# Patient Record
Sex: MALE | Race: WHITE | NOT HISPANIC OR LATINO | Employment: FULL TIME | URBAN - METROPOLITAN AREA
[De-identification: names, ages, dates, MRNs, and addresses within clinical notes are randomized per-mention and may not be internally consistent; named-entity substitution may affect disease eponyms.]

---

## 2023-10-11 ENCOUNTER — APPOINTMENT (OUTPATIENT)
Dept: PRIMARY CARE | Facility: CLINIC | Age: 68
End: 2023-10-11
Payer: COMMERCIAL

## 2023-12-05 ENCOUNTER — APPOINTMENT (OUTPATIENT)
Dept: PRIMARY CARE | Facility: CLINIC | Age: 68
End: 2023-12-05
Payer: COMMERCIAL

## 2024-01-11 ENCOUNTER — OFFICE VISIT (OUTPATIENT)
Dept: PRIMARY CARE | Facility: CLINIC | Age: 69
End: 2024-01-11
Payer: MEDICARE

## 2024-01-11 ENCOUNTER — TELEPHONE (OUTPATIENT)
Dept: PRIMARY CARE | Facility: CLINIC | Age: 69
End: 2024-01-11

## 2024-01-11 VITALS
SYSTOLIC BLOOD PRESSURE: 130 MMHG | OXYGEN SATURATION: 99 % | WEIGHT: 162 LBS | HEART RATE: 63 BPM | HEIGHT: 68 IN | RESPIRATION RATE: 14 BRPM | DIASTOLIC BLOOD PRESSURE: 70 MMHG | BODY MASS INDEX: 24.55 KG/M2

## 2024-01-11 DIAGNOSIS — E78.5 HYPERLIPIDEMIA, UNSPECIFIED HYPERLIPIDEMIA TYPE: ICD-10-CM

## 2024-01-11 DIAGNOSIS — R35.1 NOCTURIA: ICD-10-CM

## 2024-01-11 DIAGNOSIS — Z00.00 PREVENTATIVE HEALTH CARE: ICD-10-CM

## 2024-01-11 DIAGNOSIS — Z00.00 MEDICARE ANNUAL WELLNESS VISIT, INITIAL: Primary | ICD-10-CM

## 2024-01-11 DIAGNOSIS — H91.93 BILATERAL HEARING LOSS, UNSPECIFIED HEARING LOSS TYPE: ICD-10-CM

## 2024-01-11 DIAGNOSIS — K50.919 CROHN'S DISEASE WITH COMPLICATION, UNSPECIFIED GASTROINTESTINAL TRACT LOCATION (MULTI): ICD-10-CM

## 2024-01-11 PROCEDURE — 1170F FXNL STATUS ASSESSED: CPT | Performed by: INTERNAL MEDICINE

## 2024-01-11 PROCEDURE — 1036F TOBACCO NON-USER: CPT | Performed by: INTERNAL MEDICINE

## 2024-01-11 PROCEDURE — G0402 INITIAL PREVENTIVE EXAM: HCPCS | Performed by: INTERNAL MEDICINE

## 2024-01-11 RX ORDER — BISMUTH SUBSALICYLATE 262 MG
1 TABLET,CHEWABLE ORAL DAILY
COMMUNITY
End: 2024-03-13 | Stop reason: ALTCHOICE

## 2024-01-11 ASSESSMENT — PATIENT HEALTH QUESTIONNAIRE - PHQ9
1. LITTLE INTEREST OR PLEASURE IN DOING THINGS: NOT AT ALL
2. FEELING DOWN, DEPRESSED OR HOPELESS: NOT AT ALL
SUM OF ALL RESPONSES TO PHQ9 QUESTIONS 1 AND 2: 0

## 2024-01-11 ASSESSMENT — ACTIVITIES OF DAILY LIVING (ADL)
DOING_HOUSEWORK: INDEPENDENT
GROCERY_SHOPPING: INDEPENDENT
MANAGING_FINANCES: INDEPENDENT
TAKING_MEDICATION: INDEPENDENT
DRESSING: INDEPENDENT
BATHING: INDEPENDENT

## 2024-01-11 ASSESSMENT — ENCOUNTER SYMPTOMS
NAUSEA: 0
JOINT SWELLING: 0
DIARRHEA: 0
COUGH: 0
ROS GI COMMENTS: PER HPI
FEVER: 0
VOMITING: 0
CHILLS: 0
SHORTNESS OF BREATH: 0
CONSTIPATION: 0
DIAPHORESIS: 0
MYALGIAS: 0

## 2024-01-11 NOTE — TELEPHONE ENCOUNTER
Pt left before getting the pneumonia shot.  I dont beleve you put the order in, but if you can addend the patient summary saying he received the vaccine today and take that out?    Patient went to pharmacy to get the vaccine.  Thank you.

## 2024-01-11 NOTE — PROGRESS NOTES
"Subjective   Reason for Visit: Jarod Covarrubias is an 68 y.o. male here for a  INITIAL /WELCOME  Medicare Wellness visit.    HAD FLU SHOT    SEES EYE  YEARLY       Past Medical, Surgical, and Family History reviewed and updated in chart.         HPI  MEDICARE ANNUAL CHECKUP    DOING WELL OTHER THAN MOST OF OCTOBER WAS IN [place], MAY HAVE EATEN SOME FOOD THAT GAVE HIM DIARRHEA FOR A FEW WEEKS, ATE RICE AND TOOK IMMODIUM AND NOW BETTER    SCHEDULED FOR COLONOSCOPY WITH DR. 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  HAD NOISE EXPOSURE IN LIFE    Patient Care Team:  Jon Francois MD as PCP - General (Internal Medicine)     Review of Systems   Constitutional:  Negative for chills, diaphoresis and fever.   Respiratory:  Negative for cough and shortness of breath.    Cardiovascular:  Negative for chest pain and leg swelling.   Gastrointestinal:  Negative for constipation, diarrhea, nausea and vomiting.        PER HPI   Musculoskeletal:  Negative for joint swelling and myalgias.       Objective   Vitals:  /70   Pulse 63   Resp 14   Ht 1.727 m (5' 8\")   Wt 73.5 kg (162 lb)   SpO2 99%   BMI 24.63 kg/m²       Physical Exam  Vitals reviewed.   Constitutional:       General: He is not in acute distress.     Appearance: He is not ill-appearing.   Cardiovascular:      Rate and Rhythm: Normal rate and regular rhythm.      Pulses: Normal pulses.      Heart sounds:      No gallop.   Pulmonary:      Breath sounds: Normal breath sounds. No wheezing, rhonchi or rales.   Abdominal:      General: Abdomen is flat. Bowel sounds are normal.      Palpations: Abdomen is soft.      Tenderness: There is no guarding or rebound.   Musculoskeletal:      Right lower leg: No edema.      Left lower leg: No edema.         Assessment/Plan "   Problem List Items Addressed This Visit       Crohn's disease with complication, unspecified gastrointestinal tract location (CMS/[facility])    Current Assessment & Plan     Monitored BY DR. VILLALOBOS, GI         Medicare annual wellness visit, initial - Primary     Other Visit Diagnoses       Hyperlipidemia, unspecified hyperlipidemia type        Relevant Orders    Lipid Panel    Abdominal aorta anuerysm (AAA) screening    Nocturia        Relevant Orders    Prostate Specific Antigen, Screen    Preventative health care        Relevant Orders    Hepatitis C Antibody    Bilateral hearing loss, unspecified hearing loss type        Relevant Orders    Referral to Audiology          Patient Instructions    FASTING LABS ARE ORDERED FOR TODAY TO CATCH UP ON SCREENING LABS    3.  LABS FROM LAST WEEK ARE REVIEWED AND DO NOT SHOW ANY SUSPICION FOR CHRON'S ACTIVITY    4.  ONGOING DIET/EXERCISE/WEIGHT MANAGEMENT    5.  FOLLOW UP DR. VILLALOBOS FOR COLONOSCOPY AS PLANNED    6.  CONTINUE REGULAR EYE EXAMS AS YOU ARE DOING    8.  YEARLY FOLLOW UP OR AS NEEDED HERE.    9.  REFER [facility] FOR HEARING CHECK

## 2024-01-11 NOTE — PATIENT INSTRUCTIONS
FASTING LABS ARE ORDERED FOR TODAY TO CATCH UP ON SCREENING LABS    3.  LABS FROM LAST WEEK ARE REVIEWED AND DO NOT SHOW ANY SUSPICION FOR CHRON'S ACTIVITY    4.  ONGOING DIET/EXERCISE/WEIGHT MANAGEMENT    5.  FOLLOW UP DR. VILLALOBOS FOR COLONOSCOPY AS PLANNED    6.  CONTINUE REGULAR EYE EXAMS AS YOU ARE DOING    8.  YEARLY FOLLOW UP OR AS NEEDED HERE.    9.  REFER [facility] FOR HEARING CHECK

## 2024-01-22 ENCOUNTER — LAB (OUTPATIENT)
Dept: LAB | Facility: LAB | Age: 69
End: 2024-01-22
Payer: MEDICARE

## 2024-01-22 ENCOUNTER — HOSPITAL ENCOUNTER (OUTPATIENT)
Dept: CARDIOLOGY | Facility: CLINIC | Age: 69
Discharge: HOME | End: 2024-01-22
Payer: MEDICARE

## 2024-01-22 DIAGNOSIS — E78.5 HYPERLIPIDEMIA, UNSPECIFIED HYPERLIPIDEMIA TYPE: ICD-10-CM

## 2024-01-22 DIAGNOSIS — Z00.00 PREVENTATIVE HEALTH CARE: ICD-10-CM

## 2024-01-22 DIAGNOSIS — Z13.6 ENCOUNTER FOR SCREENING FOR CARDIOVASCULAR DISORDERS: ICD-10-CM

## 2024-01-22 DIAGNOSIS — R35.1 NOCTURIA: ICD-10-CM

## 2024-01-22 LAB
CHOLEST SERPL-MCNC: 209 MG/DL (ref 0–199)
CHOLESTEROL/HDL RATIO: 4
HCV AB SER QL: NONREACTIVE
HDLC SERPL-MCNC: 52.3 MG/DL
LDLC SERPL CALC-MCNC: 139 MG/DL
NON HDL CHOLESTEROL: 157 MG/DL (ref 0–149)
PSA SERPL-MCNC: 1.89 NG/ML
TRIGL SERPL-MCNC: 90 MG/DL (ref 0–149)
VLDL: 18 MG/DL (ref 0–40)

## 2024-01-22 PROCEDURE — 36415 COLL VENOUS BLD VENIPUNCTURE: CPT

## 2024-01-22 PROCEDURE — 86803 HEPATITIS C AB TEST: CPT

## 2024-01-22 PROCEDURE — 76706 US ABDL AORTA SCREEN AAA: CPT

## 2024-01-22 PROCEDURE — 84153 ASSAY OF PSA TOTAL: CPT

## 2024-01-22 PROCEDURE — 80061 LIPID PANEL: CPT

## 2024-01-22 PROCEDURE — 76706 US ABDL AORTA SCREEN AAA: CPT | Performed by: INTERNAL MEDICINE

## 2024-01-23 ENCOUNTER — CLINICAL SUPPORT (OUTPATIENT)
Dept: AUDIOLOGY | Facility: CLINIC | Age: 69
End: 2024-01-23
Payer: MEDICARE

## 2024-01-23 DIAGNOSIS — H91.93 BILATERAL HEARING LOSS, UNSPECIFIED HEARING LOSS TYPE: ICD-10-CM

## 2024-01-23 DIAGNOSIS — H90.3 ASYMMETRIC SNHL (SENSORINEURAL HEARING LOSS): Primary | ICD-10-CM

## 2024-01-23 DIAGNOSIS — H93.11 TINNITUS OF RIGHT EAR: ICD-10-CM

## 2024-01-23 PROCEDURE — 92557 COMPREHENSIVE HEARING TEST: CPT | Performed by: AUDIOLOGIST

## 2024-01-23 PROCEDURE — 92567 TYMPANOMETRY: CPT | Performed by: AUDIOLOGIST

## 2024-01-23 NOTE — PROGRESS NOTES
Mr. Covarrubias age 68, was seen today for a hearing evaluation at the referral of his PCP, Dr. Francois.  He reported concern for difficulty understanding conversation in restaurants and other noisy environments.  He also reported tinnitus in his right ear only.  Ear pressure, pain, history of occupational noise exposure was denied but he did state [place].    Results:  Otoscopy revealed clear ear canals and tympanic membranes were visualized bilaterally.  Tympanometry revealed normal, Type A tympanograms, indicating normal ear canal volume, peak pressure and compliance bilaterally.  Audiometric thresholds revealed a slight sloping to moderate sensorineural hearing loss in his left ear and a slight sloping to profound sensorineural hearing loss in his right ear.  Word recognition scores were excellent bilaterally.    Recommendations:  Follow-up with PCP, Dr. Francois, as medically directed.  Consider possible binaural amplification.  Retest hearing annually to monitor.

## 2024-02-15 PROBLEM — I44.7 LBBB (LEFT BUNDLE BRANCH BLOCK): Status: ACTIVE | Noted: 2024-02-15

## 2024-02-15 PROBLEM — H91.93 BILATERAL HEARING LOSS: Status: ACTIVE | Noted: 2024-02-15

## 2024-02-15 PROBLEM — R35.1 NOCTURIA: Status: ACTIVE | Noted: 2024-02-15

## 2024-02-15 PROBLEM — E78.5 HYPERLIPIDEMIA: Status: ACTIVE | Noted: 2024-02-15

## 2024-02-15 RX ORDER — MULTIVITAMIN
TABLET ORAL
COMMUNITY
Start: 2021-09-14

## 2024-03-13 ENCOUNTER — OFFICE VISIT (OUTPATIENT)
Dept: CARDIOLOGY | Facility: CLINIC | Age: 69
End: 2024-03-13
Payer: MEDICARE

## 2024-03-13 VITALS
HEIGHT: 68 IN | DIASTOLIC BLOOD PRESSURE: 70 MMHG | BODY MASS INDEX: 23.79 KG/M2 | HEART RATE: 67 BPM | SYSTOLIC BLOOD PRESSURE: 130 MMHG | OXYGEN SATURATION: 97 % | WEIGHT: 157 LBS

## 2024-03-13 DIAGNOSIS — I44.7 LBBB (LEFT BUNDLE BRANCH BLOCK): Primary | ICD-10-CM

## 2024-03-13 DIAGNOSIS — E78.5 HYPERLIPIDEMIA, UNSPECIFIED HYPERLIPIDEMIA TYPE: ICD-10-CM

## 2024-03-13 PROCEDURE — 93000 ELECTROCARDIOGRAM COMPLETE: CPT | Performed by: INTERNAL MEDICINE

## 2024-03-13 PROCEDURE — 1126F AMNT PAIN NOTED NONE PRSNT: CPT | Performed by: INTERNAL MEDICINE

## 2024-03-13 PROCEDURE — 1036F TOBACCO NON-USER: CPT | Performed by: INTERNAL MEDICINE

## 2024-03-13 PROCEDURE — 1159F MED LIST DOCD IN RCRD: CPT | Performed by: INTERNAL MEDICINE

## 2024-03-13 PROCEDURE — 1160F RVW MEDS BY RX/DR IN RCRD: CPT | Performed by: INTERNAL MEDICINE

## 2024-03-13 PROCEDURE — 99214 OFFICE O/P EST MOD 30 MIN: CPT | Performed by: INTERNAL MEDICINE

## 2024-03-13 ASSESSMENT — PAIN SCALES - GENERAL: PAINLEVEL: 0-NO PAIN

## 2024-03-13 ASSESSMENT — ENCOUNTER SYMPTOMS
OCCASIONAL FEELINGS OF UNSTEADINESS: 0
LOSS OF SENSATION IN FEET: 0
DEPRESSION: 0

## 2024-03-13 NOTE — PROGRESS NOTES
Chief Complaint:   No chief complaint on file.     History Of Present Illness:    Jarod Covarrubias is a 68 y.o. male with a history of dyslipidemia and left bundle branch block here for follow-up.     Few weeks ago he was doing seated dumbbell presses and felt a pain in his left shoulder and upper chest.  He stopped and rested and it improved.  It took about a week before completely subsided.  This concerned him and he decided to come a little earlier than his scheduled appointment in the summer.    He does have occasional pain in the middle of the chest which seems to occur at rest and not with exercise or with golfing.  Otherwise he has been doing very well.  He denies any exertional chest pain or shortness of breath.     He is a retired  and an avid golfer in his residential.  He went to a place in [place] last year called [place].  He showed me pictures of the golf course on [address].  He and some golf friends are going on 2 trips in the coming months: 1 near [place] and then another trip to [place].  They are thinking about maybe going out to [place] next year.    Abdominal aortic aneurysm screening duplex 1/22/2024: No evidence of aneurysmal disease.     CT calcium score 2/16/2022: Total score 7.7. Mid to lower ascending aorta normal in diameter. Other findings as described.      Past Medical History:  He has no past medical history on file.    Past Surgical History:  He has no past surgical history on file.      Social History:  He reports that he has quit smoking. His smoking use included cigarettes. He has never used smokeless tobacco. He reports current alcohol use. He reports that he does not currently use drugs.    Family History:  No family history on file.     Allergies:  Patient has no known allergies.    Outpatient Medications:  Current Outpatient Medications   Medication Instructions    multivitamin (Daily Multi-Vitamin) tablet oral    zoster vaccine-recombinant  "adjuvanted (Shingrix) 50 mcg/0.5 mL vaccine intramuscular       Last Recorded Vitals:  Visit Vitals  /70 (BP Location: Right arm, Patient Position: Sitting)   Pulse 67   Ht 1.727 m (5' 8\")   Wt 71.2 kg (157 lb)   SpO2 97%   BMI 23.87 kg/m²   Smoking Status Former   BSA 1.85 m²      LASTWT(3):   Wt Readings from Last 3 Encounters:   03/13/24 71.2 kg (157 lb)   01/11/24 73.5 kg (162 lb)   07/27/23 72.6 kg (160 lb)       Physical Exam:  In general: alert and in no acute distress.   HEENT: Carotid upstrokes normal with no bruits. JVP is normal.   Pulmonary: Clear to auscultation bilaterally.  Cardiovascular: S1,S2, regular. No appreciable murmurs, rubs or gallops.   Lower extremities: Warm. 2+ distal pulses. No edema.     Last Labs:  CBC -  Recent Labs     12/08/22  1122   WBC 6.5   HGB 13.1*   HCT 41.2      MCV 90       CMP -  Recent Labs     12/08/22  1122      K 4.6      CO2 29   ANIONGAP 12   BUN 16   CREATININE 0.85     Recent Labs     12/08/22  1122   ALBUMIN 4.0   ALKPHOS 68   ALT 20   AST 22   BILITOT 0.5       LIPID PANEL -   Recent Labs     01/22/24  0848 12/08/22  1122 01/20/22  0709   CHOL 209* 200* 195   LDLCALC 139*  --   --    LDLF  --  131* 132*   HDL 52.3 51.7 45.0   TRIG 90 86 88       No results for input(s): \"BNP\", \"HGBA1C\" in the last 53858 hours.        Assessment/Plan   1) left bundle branch block: Talked about this in more detail.  Explained that his left bundle has not changed.  He is having no exertional chest pain or shortness of breath.  No further testing needed.    I believe that the shoulder/upper chest pain he experienced was from a musculoskeletal injury and not from underlying cardiac disease.    Once again his coronary calcium score was 7.  The likelihood of him having obstructive coronary disease is very low.     2) dyslipidemia: Although his LDL is elevated current recommendations would advise continuing to observe     3) follow-up: 1 to 2 years or sooner if " needed       Miguel Martin MD

## 2024-06-12 ENCOUNTER — HOSPITAL ENCOUNTER (OUTPATIENT)
Dept: RADIOLOGY | Facility: CLINIC | Age: 69
Discharge: HOME | End: 2024-06-12
Payer: MEDICARE

## 2024-06-12 ENCOUNTER — OFFICE VISIT (OUTPATIENT)
Dept: ORTHOPEDIC SURGERY | Facility: CLINIC | Age: 69
End: 2024-06-12
Payer: MEDICARE

## 2024-06-12 DIAGNOSIS — M54.9 MID BACK PAIN: ICD-10-CM

## 2024-06-12 DIAGNOSIS — S39.012A ACUTE MYOFASCIAL STRAIN OF LUMBAR REGION, INITIAL ENCOUNTER: Primary | ICD-10-CM

## 2024-06-12 PROCEDURE — 1125F AMNT PAIN NOTED PAIN PRSNT: CPT | Performed by: EMERGENCY MEDICINE

## 2024-06-12 PROCEDURE — 72100 X-RAY EXAM L-S SPINE 2/3 VWS: CPT | Performed by: RADIOLOGY

## 2024-06-12 PROCEDURE — 1159F MED LIST DOCD IN RCRD: CPT | Performed by: EMERGENCY MEDICINE

## 2024-06-12 PROCEDURE — 72100 X-RAY EXAM L-S SPINE 2/3 VWS: CPT

## 2024-06-12 PROCEDURE — 99204 OFFICE O/P NEW MOD 45 MIN: CPT | Performed by: EMERGENCY MEDICINE

## 2024-06-12 RX ORDER — ACETAMINOPHEN 500 MG
4000 TABLET ORAL DAILY
COMMUNITY

## 2024-06-12 RX ORDER — LIDOCAINE 50 MG/G
1 PATCH TOPICAL DAILY
Qty: 8 PATCH | Refills: 0 | Status: SHIPPED | OUTPATIENT
Start: 2024-06-12 | End: 2024-07-12

## 2024-06-12 ASSESSMENT — PAIN - FUNCTIONAL ASSESSMENT: PAIN_FUNCTIONAL_ASSESSMENT: 0-10

## 2024-06-12 ASSESSMENT — PAIN SCALES - GENERAL: PAINLEVEL_OUTOF10: 1

## 2024-06-12 ASSESSMENT — PAIN DESCRIPTION - DESCRIPTORS: DESCRIPTORS: ACHING

## 2024-06-12 NOTE — PROGRESS NOTES
Subjective    Patient ID: Jarod Covarrubias is a 68 y.o. male.    Chief Complaint: Pain of the Middle Back (NPV - Right mid back pain that has been recurring. Ref by Dr. Francois. )     Last Surgery: No surgery found  Last Surgery Date: No surgery found    Patient is a very pleasant 68-year-old male coming in after being referred here by his PCP, Dr. Francois, for acute on chronic right flank pain.  The pain is mostly over the upper lumbar area and is intermittent.  The pain is described as a dull aching sensation and usually comes with changes in position or with walking.  Today he actually has little to no symptoms and rates his discomfort as less than 1 out of 10.  When he has pain episodes it is rated about a 5 out of 10.  He denies any trauma.  He has no weakness or numbness in his legs.  No change in bowel or bladder function.  He has no exertional symptoms.  No midline discomfort.  The pain has been going on for the past several months.        Objective   Back Exam     Tenderness   The patient is experiencing no tenderness.     Range of Motion   The patient has normal back ROM.  Extension:  normal   Flexion:  normal   Lateral bend right:  normal   Lateral bend left:  normal   Rotation right:  normal   Rotation left:  normal     Muscle Strength   The patient has normal back strength.  Right Quadriceps:  5/5   Left Quadriceps:  5/5   Right Hamstrings:  5/5   Left Hamstrings:  5/5     Tests   Straight leg raise right: negative  Straight leg raise left: negative    Reflexes   Patellar:  normal  Achilles:  normal    Other   Sensation: normal  Gait: normal   Erythema: no back redness  Scars: absent    Comments:  Negative stork testing.            Image Results:  ECG 12 lead (Clinic Performed)  Sinus rhythm with left bundle branch block.    X-rays of the lumbar spine were reviewed and interpreted by me on 6/12/2024 and were grossly unremarkable without any evidence of acute injuries or fractures.  Overall joint  spacing looks decent with only mild degenerative changes.    Assessment/Plan   Encounter Diagnoses:  Acute myofascial strain of lumbar region, initial encounter    Mid back pain    Orders Placed This Encounter    XR lumbar spine 2-3 views    Referral to Physical Therapy    lidocaine (Lidoderm) 5 % patch     No follow-ups on file.    We discussed his treatment options and agreed for him to start using Aleve at prescription dosing twice daily for the next 2 to 3 weeks.  I will also prescribe him Lidoderm patches for when he has painful episodes.  I am going to send him to physical therapy with an emphasis on developing and implementing a home exercise program and then we will have him follow-up with me as needed moving forward.  If his symptoms worsen or do not fully resolve he should come back in and we could potentially obtain more imaging such as an MRI but right now my concern for surgical pathology or spinal cord pathology is extremely low.    ** Please excuse any errors in grammar or translation related to this dictation. Voice recognition software was utilized to prepare this document. **       Peter Casanova MD  [facility]

## 2024-07-03 ENCOUNTER — EVALUATION (OUTPATIENT)
Dept: PHYSICAL THERAPY | Facility: CLINIC | Age: 69
End: 2024-07-03
Payer: MEDICARE

## 2024-07-03 DIAGNOSIS — S39.012A ACUTE MYOFASCIAL STRAIN OF LUMBAR REGION, INITIAL ENCOUNTER: ICD-10-CM

## 2024-07-03 PROCEDURE — 97161 PT EVAL LOW COMPLEX 20 MIN: CPT | Mod: GP

## 2024-07-03 NOTE — PROGRESS NOTES
Physical Therapy Evaluation and Treatment      Patient Name: Jarod Covarrubias  MRN: [id number]  Today's Date: 7/3/2024 7/8/2024 (date note finished).   Visit #1  Time Calculation  Start Time: 1100  Stop Time: 1140  Time Calculation (min): 40 min    Insurance:  2024  90 DAY 10/3/2024     Assessment:  Patient is referred to outpatient physical therapy with acute myofascial strain lumbar region on R side with periodic symptoms.  Unable to reproduce patients symptoms upon evaluation. Pts R sided pain does not appear to have a mechanism, given the inconsistent pain presentation. Examination findings are consistent with muscular pain on R side LB. Patient will benefit from physical therapy services to improve listed impairments. Initiated treatment today to address these impairments.    Patient with the following impairments: decreased muscle performance, decreased ROM, and pain    Patient's response to session: No change in pain, Increased ROM/joint mobility, and Increased knowledge and understanding    Plan:  Treatment/Interventions: Aquatic therapy, Education/ Instruction, Electrical stimulation, Gait training, Hot pack, Iontophoresis, Manual therapy, Mechanical traction, Neuromuscular re-education, Therapeutic activities, Therapeutic exercises  PT Plan: Skilled PT  PT Frequency: Other (Comment) (everyother week)  Duration: 6 Visits  Certification Period Start Date: 07/03/24  Certification Period End Date: 10/03/24  Rehab Potential: Fair  Plan of Care Agreement: Patient  1) HEP for pain reduction in R side.     Check tolerance to stretches  2)patient finish outcome measure      Current Problem:   1. Acute myofascial strain of lumbar region, initial encounter  Referral to Physical Therapy          Subjective   Patient presenting today pain in R side of the back. This started a couple years ago without mechanism of injury.  The pain comes and goes,  thought it was vitamin D and it wasn't because it didn't help reduce  the pain.  Pt denies radiating pain, and pain localized to R side LB.  2 weeks ago when I was golfing the R side pain without swinging that's when I decided to get it further checked out.  The pain in the right side of the back happens about once or twice a month.  When the back pain starts it is present 1/10 and then goes away after about 20 seconds.  Pt plays golf 2-3 days a week.     PMH: Crohn's disease, and L Bundle branch block.     Patient denies numbness/tingling, changes in bowel/bladder, saddle paresthesias, and falls. Patient wishes to relieve symptoms.       Personal Goals:   1) eliminate pain             Objective   Hip Musculoskeletal Exam    Range of Motion    Right      Active flexion: 80 (HS tightness.).     Left      Active flexion: 80 (HS tightness.).     Strength    Right      Extension: 4-/5.       Flexion: 3/5.     Left      Extension: 4-/5.       Flexion: 3/5.      Spine Musculoskeletal Exam    Range of Motion    Thoracolumbar       Flexion: normal. Flexion detail: no pain.     Extension: normal. Extension detail: no pain.       Right      Lateral bendin%. Lateral bending comment: stiffness. Lateral bending detail: no pain.       Lateral rotation: 75%. Lateral rotation detail: no pain.       Left      Lateral bendin%. Lateral bending comment: stiffness. Lateral bending detail: no pain.       Lateral rotation: 50%. Lateral rotation detail: no pain.      Strength    Thoracolumbar       Right      Hip flexion: 3/5.        Left      Hip flexion: 3/5.     Knee Musculoskeletal Exam    Range of Motion    Right      Active extension: 0      Passive flexion: 80 (anterior pelvic tilt)    Left      Active extension: 0      Passive flexion: 80 (anterior pelvic tilt)    Strength    Right      Extension: 4-/5.       Flexion: 3/5.     Left      Extension: 4-/5.       Flexion: 3/5.      Outcome Measures:        Treatments:  Therapeutic Exercise (04033):   minutes      Manual Therapy (70803):    minutes      Neuromuscular Re-education (56146):   minutes      Education and discussion on HEP and treatment regarding the benefits related to current condition, POC, pathophysiology, and precautions  *added to HEP  HEP:HOME EXERCISE PROGRAM [[id number]]  Hamstring Stretch  -  Repeat 10 Repetitions, Hold 15 Seconds, Complete 1 Set, Perform 1 Times a Day  PRONE QUAD STRETCH WITH MULTI-LOOP STRAP -  Repeat 10 Repetitions, Hold 15 Seconds, Complete 1 Set, Perform 1 Times a Day    Goals:    Patient will be independent with home exercise program for proper self-management of condition. Within 4 visits.     Patient will improve active range of motion in Hip FLX, EXT, and active knee FLX for ADL completion and with reduced irritation within 6 Visits.     Patient will improve strength in hip FLX  x>4-/5 so patient can play golf with less pain within 6 Visits.     Patient will 0/10 pain in a one week with activity, within 6 Visits.

## 2024-07-11 ENCOUNTER — APPOINTMENT (OUTPATIENT)
Dept: PHYSICAL THERAPY | Facility: CLINIC | Age: 69
End: 2024-07-11
Payer: MEDICARE

## 2024-08-01 ENCOUNTER — DOCUMENTATION (OUTPATIENT)
Dept: PHYSICAL THERAPY | Facility: CLINIC | Age: 69
End: 2024-08-01
Payer: MEDICARE

## 2024-08-01 ENCOUNTER — TREATMENT (OUTPATIENT)
Dept: PHYSICAL THERAPY | Facility: CLINIC | Age: 69
End: 2024-08-01
Payer: MEDICARE

## 2024-08-01 NOTE — PROGRESS NOTES
"Physical Therapy Treatment      Patient Name: Jarod Covarrubias  MRN: [id number]  Today's Date: 8/1/2024  Visit #1       Insurance:  2024 MC 90 DAY 10/3/2024     Assessment:  ***    Patient's response to session: {TESRESPONSE:18605}    Plan:  Continue per POC.    Current Problem:   No diagnosis found.    Subjective   Pt reports, \"symptoms,               Objective   ***    Treatments:  Therapeutic Exercise (07782):   minutes      Manual Therapy (24575):   minutes      Neuromuscular Re-education (20461):   minutes      Education and discussion on HEP and treatment regarding the benefits related to current condition, POC, pathophysiology, and precautions    *added to HEP    "

## 2025-01-13 ENCOUNTER — APPOINTMENT (OUTPATIENT)
Dept: PRIMARY CARE | Facility: CLINIC | Age: 70
End: 2025-01-13
Payer: MEDICARE

## 2025-01-13 VITALS
WEIGHT: 162 LBS | OXYGEN SATURATION: 99 % | HEART RATE: 56 BPM | SYSTOLIC BLOOD PRESSURE: 130 MMHG | RESPIRATION RATE: 14 BRPM | HEIGHT: 68 IN | BODY MASS INDEX: 24.55 KG/M2 | DIASTOLIC BLOOD PRESSURE: 74 MMHG

## 2025-01-13 DIAGNOSIS — Z00.00 MEDICARE ANNUAL WELLNESS VISIT, SUBSEQUENT: Primary | ICD-10-CM

## 2025-01-13 DIAGNOSIS — R35.1 NOCTURIA: ICD-10-CM

## 2025-01-13 DIAGNOSIS — E78.5 HYPERLIPIDEMIA, UNSPECIFIED HYPERLIPIDEMIA TYPE: ICD-10-CM

## 2025-01-13 DIAGNOSIS — K50.919 CROHN'S DISEASE WITH COMPLICATION, UNSPECIFIED GASTROINTESTINAL TRACT LOCATION: ICD-10-CM

## 2025-01-13 DIAGNOSIS — E55.9 MILD VITAMIN D DEFICIENCY: ICD-10-CM

## 2025-01-13 PROCEDURE — G0439 PPPS, SUBSEQ VISIT: HCPCS | Performed by: INTERNAL MEDICINE

## 2025-01-13 PROCEDURE — 3008F BODY MASS INDEX DOCD: CPT | Performed by: INTERNAL MEDICINE

## 2025-01-13 PROCEDURE — 1159F MED LIST DOCD IN RCRD: CPT | Performed by: INTERNAL MEDICINE

## 2025-01-13 PROCEDURE — 1170F FXNL STATUS ASSESSED: CPT | Performed by: INTERNAL MEDICINE

## 2025-01-13 PROCEDURE — 1124F ACP DISCUSS-NO DSCNMKR DOCD: CPT | Performed by: INTERNAL MEDICINE

## 2025-01-13 PROCEDURE — 1036F TOBACCO NON-USER: CPT | Performed by: INTERNAL MEDICINE

## 2025-01-13 ASSESSMENT — ENCOUNTER SYMPTOMS
DIAPHORESIS: 0
NAUSEA: 0
CONSTIPATION: 0
SHORTNESS OF BREATH: 0
VOMITING: 0
FEVER: 0
MYALGIAS: 0
DIARRHEA: 0
JOINT SWELLING: 0
CHILLS: 0
COUGH: 0

## 2025-01-13 ASSESSMENT — ACTIVITIES OF DAILY LIVING (ADL)
MANAGING_FINANCES: INDEPENDENT
TAKING_MEDICATION: INDEPENDENT
DOING_HOUSEWORK: INDEPENDENT
GROCERY_SHOPPING: INDEPENDENT
BATHING: INDEPENDENT
DRESSING: INDEPENDENT

## 2025-01-13 ASSESSMENT — PATIENT HEALTH QUESTIONNAIRE - PHQ9
SUM OF ALL RESPONSES TO PHQ9 QUESTIONS 1 AND 2: 0
2. FEELING DOWN, DEPRESSED OR HOPELESS: NOT AT ALL
1. LITTLE INTEREST OR PLEASURE IN DOING THINGS: NOT AT ALL

## 2025-01-13 NOTE — PROGRESS NOTES
"Subjective   Reason for Visit: Jarod Covarrubias is an 69 y.o. male here for a Medicare Wellness visit.          Reviewed all medications by prescribing practitioner or clinical pharmacist (such as prescriptions, OTCs, herbal therapies and supplements) and documented in the medical record.    HPI  NO PROBLEMS TO SPEAK OF    ONLY TAKE VITAMINS    NO WEIGHT LOSS    COLONOSCOPY LAST YEAR, NEXT DUE 10 YEARS      Patient Care Team:  Jon Francois MD as PCP - General (Internal Medicine)  Jon Francois MD as PCP - [facility] Attributed Provider     Review of Systems   Constitutional:  Negative for chills, diaphoresis and fever.   Respiratory:  Negative for cough and shortness of breath.    Cardiovascular:  Negative for chest pain and leg swelling.   Gastrointestinal:  Negative for constipation, diarrhea, nausea and vomiting.   Musculoskeletal:  Negative for joint swelling and myalgias.       Objective   Vitals:  /74   Pulse 56   Resp 14   Ht 1.727 m (5' 8\")   Wt 73.5 kg (162 lb)   SpO2 99%   BMI 24.63 kg/m²       Physical Exam  Vitals reviewed.   Constitutional:       General: He is not in acute distress.     Appearance: He is not ill-appearing.   Cardiovascular:      Rate and Rhythm: Normal rate and regular rhythm.      Pulses: Normal pulses.      Heart sounds:      No gallop.   Pulmonary:      Breath sounds: Normal breath sounds. No wheezing, rhonchi or rales.   Abdominal:      General: Abdomen is flat. Bowel sounds are normal.      Palpations: Abdomen is soft.      Tenderness: There is no guarding or rebound.   Musculoskeletal:      Right lower leg: No edema.      Left lower leg: No edema.         Assessment & Plan  Hyperlipidemia, unspecified hyperlipidemia type    Orders:    Vitamin B12; Future    Lipid Panel; Future    TSH with reflex to Free T4 if abnormal; Future    Comprehensive Metabolic Panel; Future    CBC; Future    Mild vitamin D deficiency    Orders:    Vitamin D 25-Hydroxy,Total " (for eval of Vitamin D levels); Future    Nocturia    Orders:    Prostate Specific Antigen, Screen; Future    Crohn's disease with complication, unspecified gastrointestinal tract location  CURRENTLY QUIESCENT FOLLOWED BY GI Medicare annual wellness visit, subsequent            Patient Instructions    RECOMMEND GET FASTING LABS BUT WAIT UNTIL AFTER 1/22/25 TO PROCEED SO THE PSA LEVEL WILL BE COVERED    2.  YOU ARE OTHERWISE CAUGHT UP FROM A MEDICARE STANDPOINT FOR HEALTH SCREENING AND HEALTH MAINTENANCE    3.  ONGOING DIET/EXERCISE/WEIGHT MANAGEMENT    4.  REGULAR YEARLY EYE EXAMS RECOMMENDED AS YOU ARE DOING    5.  FOLLOW UP YEARLY OR AS NEEDED.

## 2025-01-13 NOTE — PATIENT INSTRUCTIONS
RECOMMEND GET FASTING LABS BUT WAIT UNTIL AFTER 1/22/25 TO PROCEED SO THE PSA LEVEL WILL BE COVERED    2.  YOU ARE OTHERWISE CAUGHT UP FROM A MEDICARE STANDPOINT FOR HEALTH SCREENING AND HEALTH MAINTENANCE    3.  ONGOING DIET/EXERCISE/WEIGHT MANAGEMENT    4.  REGULAR YEARLY EYE EXAMS RECOMMENDED AS YOU ARE DOING    5.  FOLLOW UP YEARLY OR AS NEEDED.

## 2025-01-29 LAB
25(OH)D3+25(OH)D2 SERPL-MCNC: 35 NG/ML (ref 30–100)
ALBUMIN SERPL-MCNC: 4.3 G/DL (ref 3.6–5.1)
ALP SERPL-CCNC: 58 U/L (ref 35–144)
ALT SERPL-CCNC: 16 U/L (ref 9–46)
ANION GAP SERPL CALCULATED.4IONS-SCNC: 9 MMOL/L (CALC) (ref 7–17)
AST SERPL-CCNC: 19 U/L (ref 10–35)
BILIRUB SERPL-MCNC: 0.5 MG/DL (ref 0.2–1.2)
BUN SERPL-MCNC: 23 MG/DL (ref 7–25)
CALCIUM SERPL-MCNC: 9.3 MG/DL (ref 8.6–10.3)
CHLORIDE SERPL-SCNC: 105 MMOL/L (ref 98–110)
CHOLEST SERPL-MCNC: 186 MG/DL
CHOLEST/HDLC SERPL: 3.4 (CALC)
CO2 SERPL-SCNC: 27 MMOL/L (ref 20–32)
CREAT SERPL-MCNC: 0.86 MG/DL (ref 0.7–1.35)
EGFRCR SERPLBLD CKD-EPI 2021: 95 ML/MIN/1.73M2
ERYTHROCYTE [DISTWIDTH] IN BLOOD BY AUTOMATED COUNT: 12.5 % (ref 11–15)
GLUCOSE SERPL-MCNC: 90 MG/DL (ref 65–99)
HCT VFR BLD AUTO: 39.7 % (ref 38.5–50)
HDLC SERPL-MCNC: 55 MG/DL
HGB BLD-MCNC: 12.9 G/DL (ref 13.2–17.1)
LDLC SERPL CALC-MCNC: 115 MG/DL (CALC)
MCH RBC QN AUTO: 28.4 PG (ref 27–33)
MCHC RBC AUTO-ENTMCNC: 32.5 G/DL (ref 32–36)
MCV RBC AUTO: 87.3 FL (ref 80–100)
NONHDLC SERPL-MCNC: 131 MG/DL (CALC)
PLATELET # BLD AUTO: 309 THOUSAND/UL (ref 140–400)
PMV BLD REES-ECKER: 10.6 FL (ref 7.5–12.5)
POTASSIUM SERPL-SCNC: 5.2 MMOL/L (ref 3.5–5.3)
PROT SERPL-MCNC: 6.7 G/DL (ref 6.1–8.1)
PSA SERPL-MCNC: 1.58 NG/ML
RBC # BLD AUTO: 4.55 MILLION/UL (ref 4.2–5.8)
SODIUM SERPL-SCNC: 141 MMOL/L (ref 135–146)
TRIGL SERPL-MCNC: 64 MG/DL
TSH SERPL-ACNC: 1.75 MIU/L (ref 0.4–4.5)
WBC # BLD AUTO: 3.9 THOUSAND/UL (ref 3.8–10.8)

## 2025-03-11 ENCOUNTER — APPOINTMENT (OUTPATIENT)
Dept: CARDIOLOGY | Facility: CLINIC | Age: 70
End: 2025-03-11
Payer: MEDICARE

## 2025-03-11 VITALS
OXYGEN SATURATION: 96 % | HEIGHT: 69 IN | WEIGHT: 161 LBS | BODY MASS INDEX: 23.85 KG/M2 | HEART RATE: 61 BPM | SYSTOLIC BLOOD PRESSURE: 120 MMHG | RESPIRATION RATE: 16 BRPM | DIASTOLIC BLOOD PRESSURE: 68 MMHG

## 2025-03-11 DIAGNOSIS — I44.7 LBBB (LEFT BUNDLE BRANCH BLOCK): ICD-10-CM

## 2025-03-11 DIAGNOSIS — E78.5 DYSLIPIDEMIA: Primary | ICD-10-CM

## 2025-03-11 PROCEDURE — 99214 OFFICE O/P EST MOD 30 MIN: CPT | Mod: 25 | Performed by: INTERNAL MEDICINE

## 2025-03-11 PROCEDURE — 93010 ELECTROCARDIOGRAM REPORT: CPT | Performed by: INTERNAL MEDICINE

## 2025-03-11 PROCEDURE — 93005 ELECTROCARDIOGRAM TRACING: CPT | Performed by: INTERNAL MEDICINE

## 2025-03-11 PROCEDURE — 99214 OFFICE O/P EST MOD 30 MIN: CPT | Performed by: INTERNAL MEDICINE

## 2025-03-11 PROCEDURE — 1036F TOBACCO NON-USER: CPT | Performed by: INTERNAL MEDICINE

## 2025-03-11 PROCEDURE — 3008F BODY MASS INDEX DOCD: CPT | Performed by: INTERNAL MEDICINE

## 2025-03-11 PROCEDURE — 1160F RVW MEDS BY RX/DR IN RCRD: CPT | Performed by: INTERNAL MEDICINE

## 2025-03-11 PROCEDURE — 1159F MED LIST DOCD IN RCRD: CPT | Performed by: INTERNAL MEDICINE

## 2025-03-11 NOTE — PROGRESS NOTES
"Chief Complaint:   No chief complaint on file.     History Of Present Illness:    Jarod Covarrubias is a 69 y.o. male with a history of dyslipidemia and left bundle branch block here for follow-up.     He is doing well.  He and his wife got to sheep [employer] dogs last year.  They will be 1-year-old soon.  He has been walking them over a mile every day.  Denies any exertional chest pain or shortness of breath.     He is a retired  and an avid golfer in his [employer].  Is planning on a golf trip to [place] in August.    Abdominal aortic aneurysm screening duplex 1/22/2024: No evidence of aneurysmal disease.     CT calcium score 2/16/2022: Total score 7.7. Mid to lower ascending aorta normal in diameter. Other findings as described.      Past Medical History:  He has no past medical history on file.    Past Surgical History:  He has no past surgical history on file.      Social History:  He reports that he has quit smoking. His smoking use included cigarettes. He has never used smokeless tobacco. He reports current alcohol use. He reports that he does not currently use drugs.    Family History:  No family history on file.     Allergies:  Patient has no known allergies.    Outpatient Medications:  Current Outpatient Medications   Medication Instructions    multivitamin (Daily Multi-Vitamin) tablet Take by mouth.       Last Recorded Vitals:  Visit Vitals  /68 (BP Location: Left arm, Patient Position: Sitting, BP Cuff Size: Adult)   Pulse 61   Resp 16   Ht 1.753 m (5' 9\")   Wt 73 kg (161 lb)   SpO2 96%   BMI 23.78 kg/m²   Smoking Status Former   BSA 1.89 m²      LASTWT(3):   Wt Readings from Last 3 Encounters:   03/11/25 73 kg (161 lb)   01/13/25 73.5 kg (162 lb)   03/13/24 71.2 kg (157 lb)       Physical Exam:  In general: alert and in no acute distress.   HEENT: Carotid upstrokes normal with no bruits. JVP is normal.   Pulmonary: Clear to auscultation bilaterally.  Cardiovascular: S1,S2, regular. " "No appreciable murmurs, rubs or gallops.   Lower extremities: Warm. 2+ distal pulses. No edema.     Last Labs:  CBC -  Recent Labs     01/28/25  0925 12/08/22  1122   WBC 3.9 6.5   HGB 12.9* 13.1*   HCT 39.7 41.2    338   MCV 87.3 90       CMP -  Recent Labs     01/28/25  0925 12/08/22  1122    140   K 5.2 4.6    104   CO2 27 29   ANIONGAP 9 12   BUN 23 16   CREATININE 0.86 0.85   EGFR 95  --      Recent Labs     01/28/25  0925 12/08/22  1122   ALBUMIN 4.3 4.0   ALKPHOS 58 68   ALT 16 20   AST 19 22   BILITOT 0.5 0.5       LIPID PANEL -   Recent Labs     01/28/25  0925 01/22/24  0848 12/08/22  1122 01/20/22  0709   CHOL 186 209* 200* 195   LDLCALC 115* 139*  --   --    LDLF  --   --  131* 132*   HDL 55 52.3 51.7 45.0   TRIG 64 90 86 88       No results for input(s): \"BNP\", \"HGBA1C\" in the last 45796 hours.        Assessment/Plan   1) left bundle branch block: No signs or symptoms to suggest an ischemic etiology for his left bundle branch block.  Calcium score was very low in the past.    We will continue to observe.     2) dyslipidemia: Although his LDL is elevated current recommendations would advise continuing to observe     3) follow-up: 1 to 2 years or sooner if needed       Miguel Martin MD  "

## 2026-02-02 ENCOUNTER — APPOINTMENT (OUTPATIENT)
Dept: PRIMARY CARE | Facility: CLINIC | Age: 71
End: 2026-02-02
Payer: MEDICARE